# Patient Record
Sex: FEMALE | Race: BLACK OR AFRICAN AMERICAN | URBAN - METROPOLITAN AREA
[De-identification: names, ages, dates, MRNs, and addresses within clinical notes are randomized per-mention and may not be internally consistent; named-entity substitution may affect disease eponyms.]

---

## 2021-11-01 ENCOUNTER — TELEPHONE (OUTPATIENT)
Dept: FAMILY MEDICINE | Facility: CLINIC | Age: 36
End: 2021-11-01
Payer: COMMERCIAL

## 2021-12-01 ENCOUNTER — OFFICE VISIT (OUTPATIENT)
Dept: FAMILY MEDICINE | Facility: CLINIC | Age: 36
End: 2021-12-01
Payer: COMMERCIAL

## 2021-12-01 VITALS
HEART RATE: 90 BPM | DIASTOLIC BLOOD PRESSURE: 62 MMHG | WEIGHT: 119.38 LBS | BODY MASS INDEX: 21.15 KG/M2 | HEIGHT: 63 IN | RESPIRATION RATE: 16 BRPM | SYSTOLIC BLOOD PRESSURE: 104 MMHG

## 2021-12-01 DIAGNOSIS — F32.A DEPRESSION, UNSPECIFIED DEPRESSION TYPE: ICD-10-CM

## 2021-12-01 DIAGNOSIS — K58.9 IRRITABLE BOWEL SYNDROME, UNSPECIFIED TYPE: ICD-10-CM

## 2021-12-01 DIAGNOSIS — Z00.00 WELLNESS EXAMINATION: Primary | ICD-10-CM

## 2021-12-01 DIAGNOSIS — G47.00 INSOMNIA, UNSPECIFIED TYPE: ICD-10-CM

## 2021-12-01 DIAGNOSIS — Z13.31 POSITIVE DEPRESSION SCREENING: ICD-10-CM

## 2021-12-01 PROCEDURE — 99395 PR PREVENTIVE VISIT,EST,18-39: ICD-10-PCS | Mod: S$GLB,,, | Performed by: INTERNAL MEDICINE

## 2021-12-01 PROCEDURE — 99999 PR PBB SHADOW E&M-EST. PATIENT-LVL III: ICD-10-PCS | Mod: PBBFAC,,, | Performed by: INTERNAL MEDICINE

## 2021-12-01 PROCEDURE — 99395 PREV VISIT EST AGE 18-39: CPT | Mod: S$GLB,,, | Performed by: INTERNAL MEDICINE

## 2021-12-01 PROCEDURE — 99999 PR PBB SHADOW E&M-EST. PATIENT-LVL III: CPT | Mod: PBBFAC,,, | Performed by: INTERNAL MEDICINE

## 2021-12-01 RX ORDER — LORAZEPAM 0.5 MG/1
0.5 TABLET ORAL DAILY PRN
COMMUNITY
Start: 2021-11-21 | End: 2022-01-31

## 2021-12-01 RX ORDER — FLUOXETINE HYDROCHLORIDE 20 MG/1
60 CAPSULE ORAL EVERY MORNING
COMMUNITY
Start: 2021-11-23

## 2021-12-01 RX ORDER — MIRTAZAPINE 15 MG/1
15 TABLET, FILM COATED ORAL NIGHTLY
COMMUNITY
Start: 2021-11-05 | End: 2022-01-31

## 2021-12-05 PROBLEM — G47.00 INSOMNIA: Status: ACTIVE | Noted: 2021-12-05

## 2021-12-05 PROBLEM — K58.9 IRRITABLE BOWEL SYNDROME: Status: ACTIVE | Noted: 2021-12-05

## 2021-12-05 PROBLEM — F32.A DEPRESSION: Status: ACTIVE | Noted: 2021-12-05

## 2021-12-16 LAB
ALBUMIN SERPL-MCNC: 4.6 G/DL (ref 3.8–4.8)
ALBUMIN/GLOB SERPL: 2 {RATIO} (ref 1.2–2.2)
ALP SERPL-CCNC: 51 IU/L (ref 44–121)
ALT SERPL-CCNC: 48 IU/L (ref 0–32)
AST SERPL-CCNC: 37 IU/L (ref 0–40)
BASOPHILS # BLD AUTO: 0 X10E3/UL (ref 0–0.2)
BASOPHILS NFR BLD AUTO: 1 %
BILIRUB SERPL-MCNC: 0.3 MG/DL (ref 0–1.2)
BUN SERPL-MCNC: 10 MG/DL (ref 6–20)
BUN/CREAT SERPL: 15 (ref 9–23)
CALCIUM SERPL-MCNC: 9.3 MG/DL (ref 8.7–10.2)
CHLORIDE SERPL-SCNC: 101 MMOL/L (ref 96–106)
CHOLEST SERPL-MCNC: 191 MG/DL (ref 100–199)
CO2 SERPL-SCNC: 21 MMOL/L (ref 20–29)
CREAT SERPL-MCNC: 0.65 MG/DL (ref 0.57–1)
EOSINOPHIL # BLD AUTO: 0 X10E3/UL (ref 0–0.4)
EOSINOPHIL NFR BLD AUTO: 0 %
ERYTHROCYTE [DISTWIDTH] IN BLOOD BY AUTOMATED COUNT: 15.1 % (ref 11.7–15.4)
GLOBULIN SER CALC-MCNC: 2.3 G/DL (ref 1.5–4.5)
GLUCOSE SERPL-MCNC: 83 MG/DL (ref 65–99)
HCT VFR BLD AUTO: 34.9 % (ref 34–46.6)
HDLC SERPL-MCNC: 66 MG/DL
HGB BLD-MCNC: 10.6 G/DL (ref 11.1–15.9)
IMM GRANULOCYTES # BLD AUTO: 0 X10E3/UL (ref 0–0.1)
IMM GRANULOCYTES NFR BLD AUTO: 0 %
LDLC SERPL CALC-MCNC: 110 MG/DL (ref 0–99)
LYMPHOCYTES # BLD AUTO: 1.1 X10E3/UL (ref 0.7–3.1)
LYMPHOCYTES NFR BLD AUTO: 33 %
MCH RBC QN AUTO: 23.8 PG (ref 26.6–33)
MCHC RBC AUTO-ENTMCNC: 30.4 G/DL (ref 31.5–35.7)
MCV RBC AUTO: 78 FL (ref 79–97)
MONOCYTES # BLD AUTO: 0.3 X10E3/UL (ref 0.1–0.9)
MONOCYTES NFR BLD AUTO: 10 %
NEUTROPHILS # BLD AUTO: 1.9 X10E3/UL (ref 1.4–7)
NEUTROPHILS NFR BLD AUTO: 56 %
PLATELET # BLD AUTO: 416 X10E3/UL (ref 150–450)
POTASSIUM SERPL-SCNC: 4.7 MMOL/L (ref 3.5–5.2)
PROT SERPL-MCNC: 6.9 G/DL (ref 6–8.5)
RBC # BLD AUTO: 4.45 X10E6/UL (ref 3.77–5.28)
SARS-COV-2 IGG SERPL IA-ACNC: 27.8 AU/ML
SARS-COV-2 SPIKE AB INTERP: POSITIVE
SODIUM SERPL-SCNC: 136 MMOL/L (ref 134–144)
TRIGL SERPL-MCNC: 83 MG/DL (ref 0–149)
TSH SERPL DL<=0.005 MIU/L-ACNC: 0.7 UIU/ML (ref 0.45–4.5)
VLDLC SERPL CALC-MCNC: 15 MG/DL (ref 5–40)
WBC # BLD AUTO: 3.3 X10E3/UL (ref 3.4–10.8)

## 2021-12-17 ENCOUNTER — TELEPHONE (OUTPATIENT)
Dept: FAMILY MEDICINE | Facility: CLINIC | Age: 36
End: 2021-12-17
Payer: COMMERCIAL

## 2021-12-20 ENCOUNTER — OFFICE VISIT (OUTPATIENT)
Dept: FAMILY MEDICINE | Facility: CLINIC | Age: 36
End: 2021-12-20
Payer: COMMERCIAL

## 2021-12-20 VITALS
HEIGHT: 63 IN | BODY MASS INDEX: 20.66 KG/M2 | OXYGEN SATURATION: 98 % | SYSTOLIC BLOOD PRESSURE: 111 MMHG | HEART RATE: 87 BPM | WEIGHT: 116.63 LBS | TEMPERATURE: 99 F | DIASTOLIC BLOOD PRESSURE: 68 MMHG

## 2021-12-20 DIAGNOSIS — G50.0 TRIGEMINAL NEURALGIA: Primary | ICD-10-CM

## 2021-12-20 PROCEDURE — 99999 PR PBB SHADOW E&M-EST. PATIENT-LVL IV: CPT | Mod: PBBFAC,,, | Performed by: FAMILY MEDICINE

## 2021-12-20 PROCEDURE — 99214 PR OFFICE/OUTPT VISIT, EST, LEVL IV, 30-39 MIN: ICD-10-PCS | Mod: S$GLB,,, | Performed by: FAMILY MEDICINE

## 2021-12-20 PROCEDURE — 99999 PR PBB SHADOW E&M-EST. PATIENT-LVL IV: ICD-10-PCS | Mod: PBBFAC,,, | Performed by: FAMILY MEDICINE

## 2021-12-20 PROCEDURE — 99214 OFFICE O/P EST MOD 30 MIN: CPT | Mod: S$GLB,,, | Performed by: FAMILY MEDICINE

## 2021-12-20 RX ORDER — NAPROXEN 500 MG/1
500 TABLET ORAL 2 TIMES DAILY
COMMUNITY
Start: 2021-12-17 | End: 2021-12-20 | Stop reason: SDUPTHER

## 2021-12-20 RX ORDER — TRAMADOL HYDROCHLORIDE 50 MG/1
50 TABLET ORAL EVERY 6 HOURS PRN
COMMUNITY
Start: 2021-12-17 | End: 2021-12-20 | Stop reason: SDUPTHER

## 2021-12-20 RX ORDER — METHOCARBAMOL 500 MG/1
TABLET, FILM COATED ORAL
COMMUNITY
Start: 2021-12-17 | End: 2021-12-20

## 2021-12-20 RX ORDER — CARBAMAZEPINE 100 MG/1
100 CAPSULE, EXTENDED RELEASE ORAL 2 TIMES DAILY
Qty: 60 CAPSULE | Refills: 1 | Status: SHIPPED | OUTPATIENT
Start: 2021-12-20 | End: 2022-01-31

## 2021-12-20 RX ORDER — TRAMADOL HYDROCHLORIDE 50 MG/1
50 TABLET ORAL EVERY 6 HOURS PRN
Qty: 30 TABLET | Refills: 0 | Status: SHIPPED | OUTPATIENT
Start: 2021-12-20 | End: 2022-01-18

## 2021-12-20 RX ORDER — NAPROXEN 500 MG/1
500 TABLET ORAL 2 TIMES DAILY
Qty: 60 TABLET | Refills: 0 | Status: SHIPPED | OUTPATIENT
Start: 2021-12-20

## 2021-12-27 DIAGNOSIS — R74.8 ELEVATED LIVER ENZYMES: ICD-10-CM

## 2021-12-27 DIAGNOSIS — R79.89 ABNORMAL CBC: ICD-10-CM

## 2021-12-27 DIAGNOSIS — Z00.00 WELLNESS EXAMINATION: Primary | ICD-10-CM

## 2021-12-28 ENCOUNTER — TELEPHONE (OUTPATIENT)
Dept: FAMILY MEDICINE | Facility: CLINIC | Age: 36
End: 2021-12-28
Payer: COMMERCIAL

## 2022-01-06 LAB
HBV SURFACE AG SERPL QL IA: NEGATIVE
HCV AB S/CO SERPL IA: <0.1 S/CO RATIO (ref 0–0.9)
HIV 1+2 AB+HIV1 P24 AG SERPL QL IA: NON REACTIVE
IRON SATN MFR SERPL: 5 % (ref 15–55)
IRON SERPL-MCNC: 18 UG/DL (ref 27–159)
TIBC SERPL-MCNC: 346 UG/DL (ref 250–450)
UIBC SERPL-MCNC: 328 UG/DL (ref 131–425)

## 2022-01-10 DIAGNOSIS — R74.8 ELEVATED LIVER ENZYMES: ICD-10-CM

## 2022-01-10 DIAGNOSIS — D50.9 IRON DEFICIENCY ANEMIA, UNSPECIFIED IRON DEFICIENCY ANEMIA TYPE: Primary | ICD-10-CM

## 2022-01-14 DIAGNOSIS — G50.0 TRIGEMINAL NEURALGIA: ICD-10-CM

## 2022-01-14 NOTE — TELEPHONE ENCOUNTER
No new care gaps identified.  Powered by [employer]. Reference number: [id number].   1/14/2022 1:00:21 PM CST

## 2022-01-18 RX ORDER — TRAMADOL HYDROCHLORIDE 50 MG/1
TABLET ORAL
Qty: 20 TABLET | Refills: 0 | Status: SHIPPED | OUTPATIENT
Start: 2022-01-18

## 2022-01-18 NOTE — TELEPHONE ENCOUNTER
Sent in tramadol refill - she will need visit w me to eval her issues if continue for refill- make her f.u appt jourdan me

## 2022-01-20 ENCOUNTER — PATIENT OUTREACH (OUTPATIENT)
Dept: ADMINISTRATIVE | Facility: OTHER | Age: 37
End: 2022-01-20
Payer: COMMERCIAL

## 2022-01-20 NOTE — PROGRESS NOTES
Health Maintenance Due   Topic Date Due    COVID-19 Vaccine (1) Never done    Cervical Cancer Screening  Never done    TETANUS VACCINE  08/05/2012    Influenza Vaccine (1) Never done     Updates were requested from care everywhere.  Chart was reviewed for overdue Proactive [facility] Encounters ([facility]) topics (CRS, Breast Cancer Screening, Eye exam)  Health Maintenance has been updated.  LINKS immunization registry triggered.  Immunizations were reconciled.

## 2022-01-21 ENCOUNTER — TELEPHONE (OUTPATIENT)
Dept: NEUROLOGY | Facility: CLINIC | Age: 37
End: 2022-01-21
Payer: COMMERCIAL

## 2022-01-21 NOTE — TELEPHONE ENCOUNTER
----- Message from Lucho Amezcua sent at 1/21/2022 11:15 AM CST -----  Contact: Self  Type:  Patient Returning Call    Who Called:  Patient  Who Left Message for Patient:  Kerri Kirk  Does the patient know what this is regarding?:  r/s  Best Call Back Number:  [phone]  Additional Information:  PT states she missed a call from the clinic, and was returning the phone call. Please call pt back at [phone] to update and advise please.

## 2022-01-21 NOTE — TELEPHONE ENCOUNTER
Unable to leave message, mailbox full. Calling to reschedule appointment with Dr Nguyen on Monday.  Availability at this time Monday 1/31/2022 @ 8:00.

## 2022-01-21 NOTE — TELEPHONE ENCOUNTER
Called patient and rescheduled new patient appointment due to MD out ill. New Date/Time confirmed. Verbalized understanding.

## 2022-01-31 ENCOUNTER — OFFICE VISIT (OUTPATIENT)
Dept: NEUROLOGY | Facility: CLINIC | Age: 37
End: 2022-01-31
Payer: COMMERCIAL

## 2022-01-31 ENCOUNTER — LAB VISIT (OUTPATIENT)
Dept: LAB | Facility: HOSPITAL | Age: 37
End: 2022-01-31
Attending: PSYCHIATRY & NEUROLOGY
Payer: COMMERCIAL

## 2022-01-31 VITALS
RESPIRATION RATE: 17 BRPM | HEART RATE: 84 BPM | TEMPERATURE: 97 F | HEIGHT: 63 IN | SYSTOLIC BLOOD PRESSURE: 114 MMHG | WEIGHT: 113.75 LBS | BODY MASS INDEX: 20.16 KG/M2 | DIASTOLIC BLOOD PRESSURE: 77 MMHG

## 2022-01-31 DIAGNOSIS — K58.9 IRRITABLE BOWEL SYNDROME, UNSPECIFIED TYPE: ICD-10-CM

## 2022-01-31 DIAGNOSIS — G50.0 TRIGEMINAL NEURALGIA: ICD-10-CM

## 2022-01-31 DIAGNOSIS — F32.A DEPRESSION, UNSPECIFIED DEPRESSION TYPE: ICD-10-CM

## 2022-01-31 DIAGNOSIS — R51.9 LEFT FACIAL PAIN: Primary | ICD-10-CM

## 2022-01-31 DIAGNOSIS — D50.9 IRON DEFICIENCY ANEMIA, UNSPECIFIED IRON DEFICIENCY ANEMIA TYPE: ICD-10-CM

## 2022-01-31 DIAGNOSIS — F41.9 ANXIETY: ICD-10-CM

## 2022-01-31 DIAGNOSIS — G43.019 INTRACTABLE MIGRAINE WITHOUT AURA AND WITHOUT STATUS MIGRAINOSUS: ICD-10-CM

## 2022-01-31 DIAGNOSIS — G47.00 INSOMNIA, UNSPECIFIED TYPE: ICD-10-CM

## 2022-01-31 LAB
ANION GAP SERPL CALC-SCNC: 5 MMOL/L (ref 8–16)
BUN SERPL-MCNC: 5 MG/DL (ref 6–20)
CALCIUM SERPL-MCNC: 8.9 MG/DL (ref 8.7–10.5)
CHLORIDE SERPL-SCNC: 106 MMOL/L (ref 95–110)
CO2 SERPL-SCNC: 27 MMOL/L (ref 23–29)
CREAT SERPL-MCNC: 0.6 MG/DL (ref 0.5–1.4)
EST. GFR  (AFRICAN AMERICAN): >60 ML/MIN/1.73 M^2
EST. GFR  (NON AFRICAN AMERICAN): >60 ML/MIN/1.73 M^2
GLUCOSE SERPL-MCNC: 89 MG/DL (ref 70–110)
POTASSIUM SERPL-SCNC: 4.4 MMOL/L (ref 3.5–5.1)
SODIUM SERPL-SCNC: 138 MMOL/L (ref 136–145)

## 2022-01-31 PROCEDURE — 1160F PR REVIEW ALL MEDS BY PRESCRIBER/CLIN PHARMACIST DOCUMENTED: ICD-10-PCS | Mod: CPTII,S$GLB,, | Performed by: PSYCHIATRY & NEUROLOGY

## 2022-01-31 PROCEDURE — 1159F MED LIST DOCD IN RCRD: CPT | Mod: CPTII,S$GLB,, | Performed by: PSYCHIATRY & NEUROLOGY

## 2022-01-31 PROCEDURE — 99999 PR PBB SHADOW E&M-EST. PATIENT-LVL V: ICD-10-PCS | Mod: PBBFAC,,, | Performed by: PSYCHIATRY & NEUROLOGY

## 2022-01-31 PROCEDURE — 3078F PR MOST RECENT DIASTOLIC BLOOD PRESSURE < 80 MM HG: ICD-10-PCS | Mod: CPTII,S$GLB,, | Performed by: PSYCHIATRY & NEUROLOGY

## 2022-01-31 PROCEDURE — 1159F PR MEDICATION LIST DOCUMENTED IN MEDICAL RECORD: ICD-10-PCS | Mod: CPTII,S$GLB,, | Performed by: PSYCHIATRY & NEUROLOGY

## 2022-01-31 PROCEDURE — 36415 COLL VENOUS BLD VENIPUNCTURE: CPT | Mod: PO | Performed by: PSYCHIATRY & NEUROLOGY

## 2022-01-31 PROCEDURE — 3008F BODY MASS INDEX DOCD: CPT | Mod: CPTII,S$GLB,, | Performed by: PSYCHIATRY & NEUROLOGY

## 2022-01-31 PROCEDURE — 3074F SYST BP LT 130 MM HG: CPT | Mod: CPTII,S$GLB,, | Performed by: PSYCHIATRY & NEUROLOGY

## 2022-01-31 PROCEDURE — 3078F DIAST BP <80 MM HG: CPT | Mod: CPTII,S$GLB,, | Performed by: PSYCHIATRY & NEUROLOGY

## 2022-01-31 PROCEDURE — 3074F PR MOST RECENT SYSTOLIC BLOOD PRESSURE < 130 MM HG: ICD-10-PCS | Mod: CPTII,S$GLB,, | Performed by: PSYCHIATRY & NEUROLOGY

## 2022-01-31 PROCEDURE — 1160F RVW MEDS BY RX/DR IN RCRD: CPT | Mod: CPTII,S$GLB,, | Performed by: PSYCHIATRY & NEUROLOGY

## 2022-01-31 PROCEDURE — 3008F PR BODY MASS INDEX (BMI) DOCUMENTED: ICD-10-PCS | Mod: CPTII,S$GLB,, | Performed by: PSYCHIATRY & NEUROLOGY

## 2022-01-31 PROCEDURE — 99999 PR PBB SHADOW E&M-EST. PATIENT-LVL V: CPT | Mod: PBBFAC,,, | Performed by: PSYCHIATRY & NEUROLOGY

## 2022-01-31 PROCEDURE — 80048 BASIC METABOLIC PNL TOTAL CA: CPT | Performed by: PSYCHIATRY & NEUROLOGY

## 2022-01-31 PROCEDURE — 99205 OFFICE O/P NEW HI 60 MIN: CPT | Mod: S$GLB,,, | Performed by: PSYCHIATRY & NEUROLOGY

## 2022-01-31 PROCEDURE — 99205 PR OFFICE/OUTPT VISIT, NEW, LEVL V, 60-74 MIN: ICD-10-PCS | Mod: S$GLB,,, | Performed by: PSYCHIATRY & NEUROLOGY

## 2022-01-31 RX ORDER — BACLOFEN 10 MG/1
5-10 TABLET ORAL 3 TIMES DAILY PRN
Qty: 90 TABLET | Refills: 11 | Status: SHIPPED | OUTPATIENT
Start: 2022-01-31 | End: 2023-01-31

## 2022-01-31 RX ORDER — CARBAMAZEPINE 200 MG/1
TABLET, EXTENDED RELEASE ORAL
Qty: 120 TABLET | Refills: 6 | Status: SHIPPED | OUTPATIENT
Start: 2022-01-31 | End: 2022-06-03

## 2022-01-31 NOTE — PROGRESS NOTES
[facility] [place]    Chief complaint: facial pain  Referred by: Melvin Martínez MD  [address] [place] [address]     History of Present Illness:    36Y RH F with anxiety, depression, IBS, iron deficiency anemia, and headache who presents for initial evaluation of facial pain. She is here alone and able to provide history.     Headache/facial pain  history  Facial pain: began on 11/2021 at age of 36, last year she was tryign different antidepressants for depression/anxiety and in November 2021 she was prescribed lorazepam, she noted that it made her grind her teeth at night and also during the day. She had been on Prozac for 6 months prior, then was started on mirtazapine 15mg qhs which caused night sweats.  The lorazepam was started on November 2021 for anxiety, but she felt much more tense and when she went to sleep at night she was grinding her teeth. She took it for about 10 days or so. During that 10 day period she was have Left sided facial pain started in the TMJ mostly V2 distribution, then V3 into angle of mandible and she then had pain into the cheek. She mentions that she will have a continuous pain there is mild 3-4/10 in the whole Left face and into the temple. She will have superimposed electrical, shooting pain in the face which will happen all at the same time, she mentions that it typically starts in V2 distribution, then she will have it int he V1 and V3 distribution. She will have continuous pain for    She mentions that her temple is like one stab that stays for the whole time. She mentions that the face is stabbing intermittently for up to 45 minutes. She mentions that it starts as a stabbing or electrical shock. She might get nauseated, will loose appetite. She will have runny nose, tearing of the left eye as well during these attacks. She mentions that she has some photophobia will be on both eyes and phonophobia, nausea, no vomiting.   She also has  some left cervical paraspinal discomfort L>>R.  She mentions that carbamazepine when she started it 100 mg bid she felt it was worsening. She metnions that the facial pain will happen for many stabs for about 20-45 minutes, then stops for about 5 minutes and she can brush teeth, drink water, and do things , then will start again.      She mentions that initially she thought it could be shingles in the lumbar area in the left side only, so she thought she had shingles in this. She never had any actual vesicles.     Headache history: She had headache spells about 10 years ago in her 20s, but now they are very infrequent. She mentions that these headaches were moderate to severe, she would call them a migraine every now and then. She had light, sound sensitivities, not as much nausea with them, and kinesiophobia. She would take ibuprofen 800mg and would improve , but never resolve. They were lasting about 1 day. Last attack summer 2021.     Location: left V2 worse,   Character: sharp, shooting, electrical, lightning, lancinating, dull, burning, aching, pounding, pressure   Intensity:  3-10/10  Frequency: daily , 5-10 times a day up to over 10 times a day   Duration:   Associated symptoms: photophobia, phonophobia, kinesiophobia, neck tightness/pain  No neck trigger areas, no neck movements triggers.  She feels her mouth opening is limited due to triggering pain  She mentions that her jaw is not locking   Alleviating factors: sleep  Precipitating : ceiling fan, wind blowing in her face will trigger her facial pain when, brushing her teeth, talking, touching her face any area of her face will trigger it as well, talking, crying, touching her temple or left side of face.  No trigger zone in her face. She mentions that when she was grinding her teeth she thinks she broke her teeth. She has not seen by dentist. She mentions that in the back part of the mouth she feels sensivity in the back upper molars.   Family history of  headache: not really   ER/UC visits: none   Caffeine:  None   Sleep: she is taking prozac in the morning, her sleep is not doing well and has not slept a full night since this started.     Medication history:  Acute:  Apap - no benefit  Ibuprofen- no benefit  Naproxen - no benefit   Tramadol - some benefit     Preventive:  Gabapentin - no benefit   Tegretol 100 mg bid  - initially felt it was worsening, but now feels that it is helping, but feels not enough and is counting down the minutes until can take it again.     Neuroimaging and other studies:   No results found for this or any previous visit.  She states that she had CT of neck in ER at [place] and was told was normal, but I do not have the report or images for this.       ROS: The fourteen point review of system was performed.   Constitutional:  Fatigued since she has not been sleeping well due to pain, Denies fevers/cold or heat intolerance, weight loss/gain or fatigue.  Eyes:                         Denies diplopia, ptosis, visual field defects or ocular disease   excepting any listed above.  ENT:   Denies hearing loss, infection, carcinoma or other diseases excepting any listed above.   Cardiovascular:  Denies stroke, CAD, arrhythmia, CHF or other disease excepting any listed above.  Pulmonary:  Denies dyspnea, COPD, RAD or infection or neoplasm excepting any listed above.  Gastrointestinal: Denies ulcer disease, liver or other disease excepting any listed above.  Skin:   Denies rash, skin cancer, or other cutaneous disorder excepting any listed above.  Neurological:  See HPI  Musculoskeletal: Denies RA, fractures or other joint or skeletal problems excepting any listed above.  Heme/Lymphatic: Denies any blood or lymph system neoplasm or disorder excepting any listed above.  Endocrine:  Denies any thyroid or other disorders, excepting any listed above.  Allergic/Immuno: Denies any autoimmune disease or allergy excepting any listed  above.  Psychiatric:  +depression, anxiety, insomnia , Denies any disorder or treatment excepting any listed above.  Urologic:  Denies any difficulties with the urinary system or sexual function except noted above.    Past Medical History:   Diagnosis Date    Anxiety     Depression     Headache     IBS (irritable bowel syndrome)        Past Surgical History:   Procedure Laterality Date    COLONOSCOPY      2021    COLOSTOMY      Dr Archibald     ESOPHAGOGASTRODUODENOSCOPY       No family history on file.    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     2 children 20 years old and 7 years old  Social History     Tobacco Use    Smoking status: Never Smoker    Smokeless tobacco: Never Used   Substance Use Topics    Alcohol use: Not Currently     Review of patient's allergies indicates:  No Known Allergies      Current Outpatient Medications:     carBAMazepine (CARBATROL) 100 MG CM12, Take 1 capsule (100 mg total) by mouth 2 (two) times daily., Disp: 60 capsule, Rfl: 1    FLUoxetine 20 MG capsule, Take 60 mg by mouth every morning., Disp: , Rfl:     naproxen (NAPROSYN) 500 MG tablet, Take 1 tablet (500 mg total) by mouth 2 (two) times daily., Disp: 60 tablet, Rfl: 0    traMADoL (ULTRAM) 50 mg tablet, TAKE 1 TABLET BY MOUTH EVERY 6 HOURS AS NEEDED FOR PAIN, Disp: 20 tablet, Rfl: 0    LORazepam (ATIVAN) 0.5 MG tablet, Take 0.5 mg by mouth daily as needed., Disp: , Rfl:     mirtazapine (REMERON) 15 MG tablet, Take 15 mg by mouth nightly., Disp: , Rfl:     multivitamin capsule, Take 1 capsule by mouth once daily., Disp: , Rfl:     PHYSICAL EXAMINATION  Vitals:    01/31/22 0801   BP: 114/77   Pulse: 84   Resp: 17   Temp: 97.4 °F (36.3 °C)   General: The patient is a well-developed, well-nourished looking of stated age in mild acute distress becoming tearful during visit  Head: Normocephalic, atraumatic  Eyes, ears, nose and throat: normal.  Neck: Supple  Cardiovascular: No carotid  bruits.  Regular rate and rhythm.   Extremities: No clubbing, cyanosis, edema.  Able to fully open mouth, good movements  No ttp over Left TMJ, no trigger over palpation of left masseters/temporalis  No clicking/popping  There is cutaneous allodynia in V1-v3 and temporalis area   No rash/ no vesicles noted   NEUROLOGIC EXAM:  MENTAL: The patient is awake, alert and oriented times to time, place, location and situation. Intact recent memory, attention, concentration. Language and speech are normal. No aphasia, no dysarthria  CRANIAL NERVES:   CN II: visual fields are intact to confrontation with no defects;  funduscopic examination is within normal limits without evidence of papilledema and signs of venous pulsations.  Pupils are equal, round and reactive to light and accommodation.  No RAPD, no Yamilet's  III, IV and VI: extraocular movements are intact to all directions of gaze with normal convergence.  V: facial sensation is intact to light touch in divisions V1 through V3.  +cutaneous allodynia in V1-V3 on left   VII: Facial muscle strength is intact to eyebrow raising, forceful eyelid closure, and cheek puffing.    VIII: Hearing acuity is intact to finger rub.  IX, X: palate rises symmetrically and uvula midline.    XI: Sternocleidomastoid and trapezius strength are 5/5 bilaterally.    XII: Tongue protrudes midline without atrophy or fasciculations.   MOTOR EXAM: No atrophy or fasciculations are present. No pronator drift,  shows normal strength ( 5/5 strength )in all 4 extremities. Tone is normal.   SENSORY EXAM: intact pinprick, light touch, vibration, and position bilaterally.  CEREBELLAR EXAM: shows normal finger-to-nose and heel-to-shin.   DEEP TENDON REFLEXES:  +1 in brachioradialis, biceps, triceps, 1+ knee jerks with Jendrassik maneuver,  ankle jerks, downgoing toes b/l. No abnormal reflexes are present.  GAIT/STANCE: Romberg Negative.  Standard gait was normal with normal stride, arm swing and turning.   Normal  tandem gait.       Impression:  Left sided facial pain/trigeminal neuralgia - new onset left sided facial pain began V2 distribution and spread through Left V1, V3 and temporalis area which began while taking lorazapem for about 10 days which caused a lot of grinding of teeth and increased anxiety. She feels that she likely fractured teeth, but does not have dental insurance at the moment and has not been evaluated. She has continued to have daily continuous pain which is mild continuous V1-V3/temporalis pain, continuous cutaneous allodynia, daily photophobia, phonophobia, nausea.  She has superimposed lightning, electrical, shooting, burning pain mainly starting in V2 and spreading immediately from there to v1/v3 and even stabbing pain into the left temple which is many attacks back to back for 20 -45 minutes or so, then will have a break of about 5 minutes, then can be triggered on its own or from brushing, talking, cold air hitting her face, touching her face, etc. She has when pain is severe and the attacks of sharp pain tearing of the left eye at times and some ipsilateral runny nose, but the pain is the worse. Neurological examination did not demonstrate any sensory loss to trigeminal distribution, she has cutaneous allodynia, TMJ exam did not reveal any limited rom or pain on movement or palpation of the joint. I recommended she see dentist to evaluate for her concern of tooth fracture as that is outside scope of what we can do in clinic. She has found some benefit to carbamazepine 100mg bid, will change to Tegretol XR 200mg bid and can increase up to 400mg bid slowly depending on outcomes. We will check her sodium to ensure not dropping. Discussed MOA and side effect profile of tegetrol. Will have her do baclofen 10mg nightly helps with the muscle spasms she is having and with facial pain as well. She needs neuroimaging to look for secondary causes of facial pain as there are some features not  typical of [place].   She has underlying migraine history and some of her symptoms are really not related to facial pain and more migrainous in etiology thus will have her trial magnesium. Important to avoid pregnancy while on these medications.     Migraine without aura - will get neuroimaging and start magnesium         Comorbidities:  Anxiety/depression - followed by Dr. Jaden Louise  Insomnia- with anxiety/depression, but now more driven by pain per her report.   Iron deficiency anemia- followed by PCP and on supplementation  IBS- corrected per her report     Plan:   1- For preventive management: Increase Carbamazepine to 200mg twice a day x 1 week, if still having breakthough pain can increase to 400mg in am and 200mg nightly x 5-7 days, then 400mg twice a day.    2- For evening Baclofen 10mg nightly , can be done for daytime breakthrough pain 5mg to 10mg up to 2 times more -> this can make you sleepy so be careful driving, operating heavy machinery, swimming and anything that needs you to be mentally sharp    3- Start magnesium glycinate 400mg daily     4- MRI of brain with and without contrast and MRA head.     RTC in 4 weeks.         Debo Lazar MD   Board Certified Neurologist  [facility] Certified Headache Medicine     I spent  77   minutes on day of this encounter preparing, treating, and managing this patient with left facial pain, trigeminal neuralgia, migraine without aura, anxiety, depression, insomnia, ibs, iron deficiency anemia

## 2022-01-31 NOTE — PATIENT INSTRUCTIONS
1- For preventive management: Change and Increase Carbamazepine to 200mg twice a day x 1 week, if still having breakthough pain can increase to 400mg in am and 200mg nightly x 5-7 days, then 400mg twice a day.     2- For evening Baclofen 10mg nightly , can be done for daytime breakthrough pain 5mg to 10mg up to 2 times more -> this can make you sleepy so be careful driving, operating heavy machinery, swimming and anything that needs you to be mentally sharp    3- Start magnesium glycinate 400mg to 500mg daily --> this is over the counter     4- MRI of brain with and without contrast and MRA head.

## 2022-02-15 ENCOUNTER — PATIENT MESSAGE (OUTPATIENT)
Dept: NEUROLOGY | Facility: CLINIC | Age: 37
End: 2022-02-15
Payer: COMMERCIAL

## 2022-02-15 DIAGNOSIS — R51.9 FACIAL PAIN: Primary | ICD-10-CM

## 2022-02-15 DIAGNOSIS — J33.8 MAXILLARY SINUS POLYP: ICD-10-CM

## 2022-02-24 ENCOUNTER — PATIENT OUTREACH (OUTPATIENT)
Dept: ADMINISTRATIVE | Facility: OTHER | Age: 37
End: 2022-02-24
Payer: COMMERCIAL

## 2022-02-25 ENCOUNTER — OFFICE VISIT (OUTPATIENT)
Dept: OTOLARYNGOLOGY | Facility: CLINIC | Age: 37
End: 2022-02-25
Payer: COMMERCIAL

## 2022-02-25 DIAGNOSIS — J34.1 MUCOCELE OF MAXILLARY SINUS: ICD-10-CM

## 2022-02-25 DIAGNOSIS — J30.2 SEASONAL ALLERGIC RHINITIS, UNSPECIFIED TRIGGER: Primary | ICD-10-CM

## 2022-02-25 DIAGNOSIS — R51.9 FACIAL PAIN: ICD-10-CM

## 2022-02-25 DIAGNOSIS — G50.0 TRIGEMINAL NEURALGIA: ICD-10-CM

## 2022-02-25 PROCEDURE — 99203 PR OFFICE/OUTPT VISIT, NEW, LEVL III, 30-44 MIN: ICD-10-PCS | Mod: 95,,, | Performed by: OTOLARYNGOLOGY

## 2022-02-25 PROCEDURE — 1160F RVW MEDS BY RX/DR IN RCRD: CPT | Mod: CPTII,95,, | Performed by: OTOLARYNGOLOGY

## 2022-02-25 PROCEDURE — 1159F MED LIST DOCD IN RCRD: CPT | Mod: CPTII,95,, | Performed by: OTOLARYNGOLOGY

## 2022-02-25 PROCEDURE — 99203 OFFICE O/P NEW LOW 30 MIN: CPT | Mod: 95,,, | Performed by: OTOLARYNGOLOGY

## 2022-02-25 PROCEDURE — 1159F PR MEDICATION LIST DOCUMENTED IN MEDICAL RECORD: ICD-10-PCS | Mod: CPTII,95,, | Performed by: OTOLARYNGOLOGY

## 2022-02-25 PROCEDURE — 1160F PR REVIEW ALL MEDS BY PRESCRIBER/CLIN PHARMACIST DOCUMENTED: ICD-10-PCS | Mod: CPTII,95,, | Performed by: OTOLARYNGOLOGY

## 2022-02-25 RX ORDER — FLUTICASONE PROPIONATE 50 MCG
1 SPRAY, SUSPENSION (ML) NASAL 2 TIMES DAILY
Qty: 16 G | Refills: 11 | Status: SHIPPED | OUTPATIENT
Start: 2022-02-25 | End: 2024-02-17

## 2022-02-25 NOTE — PROGRESS NOTES
Virtual visit   Subjective:     Chief Complaint:  Facial pain    Kalia Carballo is a 36 y.o. female who was referred to me by Dr. Debo Nguyen * in consultation for abnormal MRI/facial pain.    Pain in the on the left side of the face in Nov 2021. Pain started in her upper teeth. She saw her dentist who did not find any abnormalities. Pain migrated to her sinus region. Now pain is present over the temporal and jaw region. Pain is stabbing and sometimes shooting. She has had tearing of the left eye during this time. She denies vision changes.     She does have shingles at the age of 32. She does reports mild allergy symptoms in the spring or the fall. Symptoms are mild. She does not take allergy medications regularly.     There is not a prior history of sinonasal surgery.  She is not an active smoker.    Past Medical History  She has a past medical history of Anxiety, Depression, Headache, and IBS (irritable bowel syndrome).    Past Surgical History  She has a past surgical history that includes Colonoscopy; Esophagogastroduodenoscopy; and Colostomy.    Family History  Her family history is not on file.    Social History  She reports that she has never smoked. She has never used smokeless tobacco. She reports previous alcohol use.    Allergies  She has No Known Allergies.    Medications  She has a current medication list which includes the following prescription(s): baclofen, carbamazepine, fluoxetine, fluticasone propionate, multivitamin, naproxen, and tramadol.    Review of Systems     Constitutional: Positive for appetite change, fatigue and unexpected weight loss.      HENT: Positive for ear discharge, facial swelling, postnasal drip, sinus pressure and dental problems.      Eyes:  Positive for eye drainage and photophobia.     Respiratory:  Negative for cough, shortness of breath, sleep apnea, snoring and wheezing.      Cardiovascular:  Positive for irregular heartbeat.     Gastrointestinal:  Negative for  abdominal pain, acid reflux, constipation, diarrhea, heartburn and vomiting.     Genitourinary: Negative for difficulty urinating, sexual problems and frequent urination.     Musc: Positive for aching muscles and neck pain.     Skin: Negative for rash.     Allergy: Positive for seasonal allergies.     Endocrine: Positive for cold intolerance and heat intolerance.     Neurological: Positive for dizziness and light-headedness.     Hematologic: Negative for bruises/bleeds easily and swollen glands.      Psychiatric: Positive for decreased concentration, depression, nervous/anxious and sleep disturbance.              Objective:   Virtual visit   There were no vitals taken for this visit.     Constitutional:   Vital signs are normal. She appears well-developed and well-nourished. Normal speech.      Head:  Normocephalic and atraumatic.     Psychiatric:   She has a normal mood and affect. Her speech is normal and behavior is normal.       Procedure    None        Data Reviewed    MRI:   Mucus retention cyst left maxillary sinus occupying approx 80% of sinus   Tiny retention cyst right maxillary sinus      Assessment:     1. Seasonal allergic rhinitis, unspecified trigger    2. Facial pain    3. Mucocele of maxillary sinus    4. Trigeminal neuralgia          Plan:     I had a long discussion with the patient regarding her condition and the further workup and management options.  Her facial pain is more consistent with trigeminal neuralgia.  Her MRI does however revealed a large mucous retention cyst in the left maxillary sinus. Typically these are asymptomatic; her cyst does not appear to be blocking the sinus outflow tract.  She does have mild seasonal allergy symptoms.  We discussed treatment of sinonasal inflammation with intranasal steroid spray twice a day and at least once a day use of the sinus irrigation.  Discussed use with distilled water.  Plan on evaluating the sinuses in 6 weeks with a CT scan.  She will  notify me sooner if her symptoms progress.  All questions answered patient was encouraged call with any questions or concerns.

## 2022-02-28 ENCOUNTER — TELEPHONE (OUTPATIENT)
Dept: OTOLARYNGOLOGY | Facility: CLINIC | Age: 37
End: 2022-02-28
Payer: COMMERCIAL

## 2022-03-07 ENCOUNTER — OFFICE VISIT (OUTPATIENT)
Dept: NEUROLOGY | Facility: CLINIC | Age: 37
End: 2022-03-07
Payer: COMMERCIAL

## 2022-03-07 VITALS
WEIGHT: 113 LBS | HEIGHT: 63 IN | TEMPERATURE: 98 F | BODY MASS INDEX: 20.02 KG/M2 | DIASTOLIC BLOOD PRESSURE: 70 MMHG | SYSTOLIC BLOOD PRESSURE: 103 MMHG | HEART RATE: 75 BPM | RESPIRATION RATE: 17 BRPM

## 2022-03-07 DIAGNOSIS — G50.0 TRIGEMINAL NEURALGIA: Primary | ICD-10-CM

## 2022-03-07 DIAGNOSIS — G43.019 INTRACTABLE MIGRAINE WITHOUT AURA AND WITHOUT STATUS MIGRAINOSUS: ICD-10-CM

## 2022-03-07 DIAGNOSIS — R51.9 LEFT FACIAL PAIN: ICD-10-CM

## 2022-03-07 PROCEDURE — 1159F PR MEDICATION LIST DOCUMENTED IN MEDICAL RECORD: ICD-10-PCS | Mod: CPTII,S$GLB,, | Performed by: PSYCHIATRY & NEUROLOGY

## 2022-03-07 PROCEDURE — 3078F DIAST BP <80 MM HG: CPT | Mod: CPTII,S$GLB,, | Performed by: PSYCHIATRY & NEUROLOGY

## 2022-03-07 PROCEDURE — 99214 OFFICE O/P EST MOD 30 MIN: CPT | Mod: S$GLB,,, | Performed by: PSYCHIATRY & NEUROLOGY

## 2022-03-07 PROCEDURE — 1159F MED LIST DOCD IN RCRD: CPT | Mod: CPTII,S$GLB,, | Performed by: PSYCHIATRY & NEUROLOGY

## 2022-03-07 PROCEDURE — 1160F PR REVIEW ALL MEDS BY PRESCRIBER/CLIN PHARMACIST DOCUMENTED: ICD-10-PCS | Mod: CPTII,S$GLB,, | Performed by: PSYCHIATRY & NEUROLOGY

## 2022-03-07 PROCEDURE — 3008F BODY MASS INDEX DOCD: CPT | Mod: CPTII,S$GLB,, | Performed by: PSYCHIATRY & NEUROLOGY

## 2022-03-07 PROCEDURE — 3008F PR BODY MASS INDEX (BMI) DOCUMENTED: ICD-10-PCS | Mod: CPTII,S$GLB,, | Performed by: PSYCHIATRY & NEUROLOGY

## 2022-03-07 PROCEDURE — 99999 PR PBB SHADOW E&M-EST. PATIENT-LVL IV: CPT | Mod: PBBFAC,,, | Performed by: PSYCHIATRY & NEUROLOGY

## 2022-03-07 PROCEDURE — 3074F SYST BP LT 130 MM HG: CPT | Mod: CPTII,S$GLB,, | Performed by: PSYCHIATRY & NEUROLOGY

## 2022-03-07 PROCEDURE — 99214 PR OFFICE/OUTPT VISIT, EST, LEVL IV, 30-39 MIN: ICD-10-PCS | Mod: S$GLB,,, | Performed by: PSYCHIATRY & NEUROLOGY

## 2022-03-07 PROCEDURE — 99999 PR PBB SHADOW E&M-EST. PATIENT-LVL IV: ICD-10-PCS | Mod: PBBFAC,,, | Performed by: PSYCHIATRY & NEUROLOGY

## 2022-03-07 PROCEDURE — 3074F PR MOST RECENT SYSTOLIC BLOOD PRESSURE < 130 MM HG: ICD-10-PCS | Mod: CPTII,S$GLB,, | Performed by: PSYCHIATRY & NEUROLOGY

## 2022-03-07 PROCEDURE — 3078F PR MOST RECENT DIASTOLIC BLOOD PRESSURE < 80 MM HG: ICD-10-PCS | Mod: CPTII,S$GLB,, | Performed by: PSYCHIATRY & NEUROLOGY

## 2022-03-07 PROCEDURE — 1160F RVW MEDS BY RX/DR IN RCRD: CPT | Mod: CPTII,S$GLB,, | Performed by: PSYCHIATRY & NEUROLOGY

## 2022-03-07 RX ORDER — OXCARBAZEPINE 150 MG/1
TABLET, FILM COATED ORAL
Qty: 120 TABLET | Refills: 3 | Status: SHIPPED | OUTPATIENT
Start: 2022-03-07 | End: 2022-06-03

## 2022-03-07 RX ORDER — GABAPENTIN 300 MG/1
300-600 CAPSULE ORAL 3 TIMES DAILY
Qty: 180 CAPSULE | Refills: 11 | Status: SHIPPED | OUTPATIENT
Start: 2022-03-07 | End: 2023-03-07

## 2022-03-07 NOTE — PATIENT INSTRUCTIONS
1- For preventive management: A. Transition from Tegretol to Trileptal (oxcarbazepine) 150 mg twice a day x 2-3 days, then 300 mg twice a day. If you are tolerating then send me message, we might increase dose depending on how you are doing.              B. Start gabapentin 300 mg nightly  x 1 week, then increase to 600mg nightly     2- Start magnesium oxide or glycinate 400 mg daily     3- continue ENT evaluation     4 - send me message with dentist update.

## 2022-03-07 NOTE — PROGRESS NOTES
[facility]    Chief complaint: facial pain    History of Present Illness:    36Y RH F with anxiety, depression, IBS, iron deficiency anemia, and headache who presents for further evaluation of Left TN. She was initially seen on 1/31/22 at which time she reported left sided facial pain starting in November 2021. She reported having grinding of teeth while on Ativan. She reported continuous V1-V3 temporalis pain, cutaneous allodynia, stabbing, photophobia, phonophobia, nausea with superimposed lightning/eletrical pain in V2 then v1-v3 multiple pain for 20-45 minutes. These electrical pain triggered by  brushing, talking, cold air hitting her face, touching her face, etc. She has when pain is severe and the attacks of sharp pain tearing of the left eye at times and some ipsilateral runny nose, but the pain is the worse.  She was increased from tegretol 100mg bid to 200mg bid and started on magnesium for migraine headache.      Today she reports she is a little better, taking medicine relieves pain little, left side of face. Currently 3/10, at best 1-2/10, and up to 10/10. She had MRI brain which did not reveal any intracranial abnormalities. She was found to have a large mucuous retention cyst in left maxillary sinus, which typically are asymptomatic, but she was sent to ENT for further guidance. It was felt that some cyst was not blocking sinus outflow tract, shewas recommended intranasal steroid bid nd once a day using sinus irrigation with distilled water , plan was to repeat CT scan sinus in 6 weeks. Since increasing Tegretol  mg twice a day her facial pain is improving, less severe, less escalations, but still occurring especially with eating , chewing , talking, touching the face will trigger it. She did increase to 400 mg twice a day for a few days, but felt she was foggy and out of it had difficulty concentrating so could not continue it. She feels that tegretol is  helping the facial pain. She continues to have temporal headache pressure with light/sound sensitivity/nausea which is almost daily. She continues to be concerned about possible fracture to one of her molars. She will be seeing her dentist this week.  She is following ENT recommendations. She does feel tegretol helps her be functional, but still having significant facial pain episodes.     Headache/facial pain  history  Facial pain: began on 11/2021 at age of 36, last year she was trying different antidepressants for depression/anxiety and in November 2021 she was prescribed lorazepam, she noted that it made her grind her teeth at night and also during the day. She had been on Prozac for 6 months prior, then was started on mirtazapine 15mg qhs which caused night sweats.  The lorazepam was started on November 2021 for anxiety, but she felt much more tense and when she went to sleep at night she was grinding her teeth. She took it for about 10 days or so. During that 10 day period she was have Left sided facial pain started in the TMJ mostly V2 distribution, then V3 into angle of mandible and she then had pain into the cheek. She mentions that she will have a continuous pain there is mild 3-4/10 in the whole Left face and into the temple. She will have superimposed electrical, shooting pain in the face which will happen all at the same time, she mentions that it typically starts in V2 distribution, then she will have it int he V1 and V3 distribution. She will have continuous pain for    She mentions that her temple is like one stab that stays for the whole time. She mentions that the face is stabbing intermittently for up to 45 minutes. She mentions that it starts as a stabbing or electrical shock. She might get nauseated, will loose appetite. She will have runny nose, tearing of the left eye as well during these attacks. She mentions that she has some photophobia will be on both eyes and phonophobia, nausea, no  vomiting.   She also has some left cervical paraspinal discomfort L>>R.  She mentions that carbamazepine when she started it 100 mg bid she felt it was worsening. She metnions that the facial pain will happen for many stabs for about 20-45 minutes, then stops for about 5 minutes and she can brush teeth, drink water, and do things , then will start again.      She mentions that initially she thought it could be shingles in the lumbar area in the left side only, so she thought she had shingles in this. She never had any actual vesicles.     Headache history: She had headache spells about 10 years ago in her 20s, but now they are very infrequent. She mentions that these headaches were moderate to severe, she would call them a migraine every now and then. She had light, sound sensitivities, not as much nausea with them, and kinesiophobia. She would take ibuprofen 800mg and would improve , but never resolve. They were lasting about 1 day. Last attack summer 2021.     Location: left V2 worse,   Character: sharp, shooting, electrical, lightning, lancinating, dull, burning, aching, pounding, pressure   Intensity:  3-10/10  Frequency: daily , 5-10 times a day up to over 10 times a day   Duration:   Associated symptoms: photophobia, phonophobia, kinesiophobia, neck tightness/pain  No neck trigger areas, no neck movements triggers.  She feels her mouth opening is limited due to triggering pain  She mentions that her jaw is not locking   Alleviating factors: sleep  Precipitating : ceiling fan, wind blowing in her face will trigger her facial pain when, brushing her teeth, talking, touching her face any area of her face will trigger it as well, talking, crying, touching her temple or left side of face.  No trigger zone in her face. She mentions that when she was grinding her teeth she thinks she broke her teeth. She has not seen by dentist. She mentions that in the back part of the mouth she feels sensivity in the back upper  molars.   Family history of headache: not really   ER/UC visits: none   Caffeine:  None   Sleep: she is taking prozac in the morning, her sleep is not doing well and has not slept a full night since this started.     Medication history:  Acute:  Apap - no benefit  Ibuprofen- no benefit  Naproxen - no benefit   Tramadol - some benefit     Preventive:  Gabapentin - no benefit   Tegretol 100 mg bid - initially felt it was worsening, but now feels that it is helping, but feels not enough and is counting down the minutes until can take it again.   Tegretol 200mg bid - improved the intensity   Baclofen 10mg- used a few times -> unclear if helpful.     Neuroimaging and other studies:   MRI brain w/wo contrast 2/22:  FINDINGS:  TRIGEMINAL NERVES: Trigeminal nerves are symmetric and normal in size and signal.  No abnormal enhancement.  No mass effect on or displacement of the trigeminal nerves.  Meckel's caves are normal in size and appearance bilaterally.     INTRACRANIAL: Brain parenchyma demonstrates normal signal and configuration.  No abnormal enhancement demonstrated.  No parenchymal restricted diffusion.  No evidence of intracranial hemorrhage.  No extra-axial fluid collection or mass.  No intracranial mass effect.  No hydrocephalus.  Midline structures have a normal configuration.  Visualized pituitary gland and infundibulum are normal.  Visualized major intracranial vascular structures demonstrate normal flow voids and are normal in course and caliber.  AICAs are noted to extend into the internal auditory canals bilaterally.  No abnormal signal in the cranial nerve 7/8 complexes.     SINUSES: Polypoid opacity in the left maxillary sinus.  Small frothy opacity in the right sphenoid sinus.  Tiny mucous retention cyst versus polyp in the right maxillary sinus.  Trace bilateral ventral ethmoid and right frontal sinus mucosal thickening.  Small left mastoid effusion.     ORBITS: Visualized orbits are  normal.     Impression:     No acute intracranial abnormality.  No trigeminal nerve abnormality identified.     MRA head 2/22:  FINDINGS:  INTERNAL CAROTIDS: No occlusion, hemodynamically significant stenosis or aneurysm.     ANTERIOR CEREBRALS: No proximal branch occlusion, hemodynamically significant stenosis or aneurysm.     MIDDLE CEREBRALS: No proximal branch occlusion, hemodynamically significant stenosis or aneurysm.     POSTERIOR CEREBRALS: No proximal branch occlusion, hemodynamically significant stenosis or aneurysm.     BASILAR: No occlusion, hemodynamically significant stenosis or aneurysm.     VERTEBRALS: No occlusion, hemodynamically significant stenosis or aneurysm.     OTHER: No vascular malformation.     Impression:     No occlusion, hemodynamically significant stenosis or aneurysm.    ROS: The fourteen point review of system was performed.   Constitutional:  Fatigued since she has not been sleeping well due to pain, Denies fevers/cold or heat intolerance, weight loss/gain or fatigue.  Eyes:                         Denies diplopia, ptosis, visual field defects or ocular disease   excepting any listed above.  ENT:   Denies hearing loss, infection, carcinoma or other diseases excepting any listed above.   Cardiovascular:  Denies stroke, CAD, arrhythmia, CHF or other disease excepting any listed above.  Pulmonary:  Denies dyspnea, COPD, RAD or infection or neoplasm excepting any listed above.  Gastrointestinal: Denies ulcer disease, liver or other disease excepting any listed above.  Skin:   Denies rash, skin cancer, or other cutaneous disorder excepting any listed above.  Neurological:  See HPI  Musculoskeletal: Denies RA, fractures or other joint or skeletal problems excepting any listed above.  Heme/Lymphatic: Denies any blood or lymph system neoplasm or disorder excepting any listed above.  Endocrine:  Denies any thyroid or other disorders, excepting any listed above.  Allergic/Immuno: Denies any  autoimmune disease or allergy excepting any listed above.  Psychiatric:  +depression, anxiety, insomnia , Denies any disorder or treatment excepting any listed above.  Urologic:  Denies any difficulties with the urinary system or sexual function except noted above.    Past Medical History:   Diagnosis Date    Anxiety     Depression     Headache     IBS (irritable bowel syndrome)        Past Surgical History:   Procedure Laterality Date    COLONOSCOPY      2021    COLOSTOMY      Dr Archibald     ESOPHAGOGASTRODUODENOSCOPY       No family history on file.    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     2 children 20 years old and 7 years old  Social History     Tobacco Use    Smoking status: Never Smoker    Smokeless tobacco: Never Used   Substance Use Topics    Alcohol use: Not Currently     Review of patient's allergies indicates:  No Known Allergies      Current Outpatient Medications:     carBAMazepine (TEGRETOL XR) 200 MG 12 hr tablet, 1 tab po bid x 1 week, then 2 tabs in am and 1 tab qhs x 1 week, then 2 tab po bid, Disp: 120 tablet, Rfl: 6    FLUoxetine 20 MG capsule, Take 60 mg by mouth every morning., Disp: , Rfl:     fluticasone propionate (FLONASE) 50 mcg/actuation nasal spray, 1 spray (50 mcg total) by Each Nostril route 2 (two) times a day., Disp: 16 g, Rfl: 11    multivitamin capsule, Take 1 capsule by mouth once daily., Disp: , Rfl:     naproxen (NAPROSYN) 500 MG tablet, Take 1 tablet (500 mg total) by mouth 2 (two) times daily., Disp: 60 tablet, Rfl: 0    traMADoL (ULTRAM) 50 mg tablet, TAKE 1 TABLET BY MOUTH EVERY 6 HOURS AS NEEDED FOR PAIN, Disp: 20 tablet, Rfl: 0    baclofen (LIORESAL) 10 MG tablet, Take 0.5-1 tablets (5-10 mg total) by mouth 3 (three) times daily as needed (facial pain). (Patient not taking: Reported on 3/7/2022), Disp: 90 tablet, Rfl: 11    PHYSICAL EXAMINATION  Vitals:    03/07/22 1123   Resp: 17   Temp: 97.6 °F (36.4 °C)   General:  The patient is a well-developed, well-nourished looking of stated age in mild acute distress becoming tearful during visit  Able to fully open mouth, good movements  No ttp over Left TMJ, no trigger over palpation of left masseters/temporalis  No clicking/popping  There is cutaneous allodynia in V1-v3 and temporalis area   NEUROLOGIC EXAM:  MENTAL: The patient is awake, alert and oriented times to time, place, location and situation. Intact recent memory, attention, concentration. Language and speech are normal. No aphasia, no dysarthria  CRANIAL NERVES:   EOMI, no facial asymmetry    MOTOR EXAM: moving UEs/LEs symmetrically   Coordination; no observable dysmetria in UEs/LEs  GAIT/STANCE: Standard gait was normal with normal stride, arm swing and turning.  No gait ataxia.       Impression:  Idiopathic trigeminal neuralgia with concomitant continuous pain  - new onset left sided facial pain began V2 distribution and spread through Left V1, V3 and temporalis area which began while taking lorazapem for about 10 days which caused a lot of grinding of teeth and increased anxiety. She feels that she likely fractured teeth, but does not have dental insurance at the moment and has not been evaluated. She has continued to have daily continuous pain which is mild continuous V1-V3/temporalis pain, continuous cutaneous allodynia, daily photophobia, phonophobia, nausea.  She has superimposed lightning, electrical, shooting, burning pain mainly starting in V2 and spreading immediately from there to v1/v3 and even stabbing pain into the left temple which is many attacks back to back for 20 -45 minutes or so, then will have a break of about 5 minutes, then can be triggered on its own or from brushing, talking, cold air hitting her face, touching her face, etc. She has when pain is severe and the attacks of sharp pain tearing of the left eye at times and some ipsilateral runny nose, but the pain is the worse. Neurological  examination did not demonstrate any sensory loss to trigeminal distribution, she has cutaneous allodynia, TMJ exam did not reveal any limited rom or pain on movement or palpation of the joint. I recommended she see dentist to evaluate for her concern of tooth fracture as that is outside scope of what we can do in clinic and will see them this week. She has seen improvement with Tegretol 200mg bid, but not enough coverage and still having frequent triggering of facial pain. Unable to tolerate higher dose with cognitive side effects. Will transition her to trileptal see if that way she can tolerate this better to get to a more therapeutic dose.         She has found some benefit to carbamazepine 100mg bid, will change to Tegretol XR 200mg bid and can increase up to 400mg bid slowly depending on outcomes. We will check her sodium to ensure not dropping. Discussed MOA and side effect profile of tegetrol. Will have her do baclofen 10mg nightly helps with the muscle spasms she is having and with facial pain as well. She needs neuroimaging to look for secondary causes of facial pain as there are some features not typical of TN.   She has underlying migraine history and some of her symptoms are really not related to facial pain and more migrainous in etiology thus will have her trial magnesium. Important to avoid pregnancy while on these medications.     Migraine without aura - no secondary headache on neuroimaging. The daily pressure with light/sound sensitivity/nausea is more c/w migraine without aura history. Discussed starting magnesium and we will sart some gabapentin as well. She is concerned about taking too many medications at once. Will discuss migraine specific acute management our next visit.         Comorbidities:  Anxiety/depression - followed by Dr. Jaden Louise  Insomnia- with anxiety/depression, but now more driven by pain per her report.   Iron deficiency anemia- followed by PCP and on  supplementation  IBS- corrected per her report     Plan:   1- For preventive management: A. Transition from Tegretol to Trileptal (oxcarbazepine) 150 mg twice a day x 2-3 days, then 300 mg twice a day. If you are tolerating then send me message, we might increase dose depending on how you are doing.              B. Start gabapentin 300 mg nightly  x 1 week, then increase to 600mg nightly     2- Start magnesium oxide or glycinate 400 mg daily     3- continue ENT evaluation     RTC in 6 weeks.       Debo Lazar MD   Board Certified Neurologist  [facility] Certified Headache Medicine     I spent  30  minutes on day of this encounter preparing, treating, and managing this patient with left facial pain, trigeminal neuralgia, migraine without aura, anxiety, depression, insomnia, ibs, iron deficiency anemia

## 2022-03-11 ENCOUNTER — PATIENT MESSAGE (OUTPATIENT)
Dept: NEUROLOGY | Facility: CLINIC | Age: 37
End: 2022-03-11
Payer: COMMERCIAL

## 2022-03-23 ENCOUNTER — PATIENT OUTREACH (OUTPATIENT)
Dept: ADMINISTRATIVE | Facility: HOSPITAL | Age: 37
End: 2022-03-23
Payer: COMMERCIAL

## 2022-03-23 ENCOUNTER — PATIENT MESSAGE (OUTPATIENT)
Dept: NEUROLOGY | Facility: CLINIC | Age: 37
End: 2022-03-23
Payer: COMMERCIAL

## 2022-03-23 NOTE — PROGRESS NOTES
Non-compliant report chart audits for CERVICAL CANCER SCREENING. Chart review completed for HM test overdue (mammograms, Colonoscopies, pap smears, DM labs, and/or EYE EXAMs)      Care Everywhere and media, updates requested and reviewed.      [facility] and [facility] reviewed.      Outreach to patient in reference to cervical cancer screening.    Outreach:  Cervical cancer screening    LMOR to return call to clinic

## 2022-04-04 ENCOUNTER — HOSPITAL ENCOUNTER (OUTPATIENT)
Dept: RADIOLOGY | Facility: HOSPITAL | Age: 37
Discharge: HOME | End: 2022-04-04
Attending: OTOLARYNGOLOGY
Payer: COMMERCIAL

## 2022-04-04 DIAGNOSIS — R51.9 FACIAL PAIN: ICD-10-CM

## 2022-04-04 DIAGNOSIS — G50.0 TRIGEMINAL NEURALGIA: ICD-10-CM

## 2022-04-04 DIAGNOSIS — J30.2 SEASONAL ALLERGIC RHINITIS, UNSPECIFIED TRIGGER: ICD-10-CM

## 2022-04-04 DIAGNOSIS — J34.1 MUCOCELE OF MAXILLARY SINUS: ICD-10-CM

## 2022-04-04 PROCEDURE — 70486 CT MAXILLOFACIAL W/O DYE: CPT | Mod: 26,,, | Performed by: RADIOLOGY

## 2022-04-04 PROCEDURE — 70486 CT MAXILLOFACIAL W/O DYE: CPT | Mod: TC,PO

## 2022-04-04 PROCEDURE — 70486 CT MEDTRONIC SINUSES WITHOUT: ICD-10-PCS | Mod: 26,,, | Performed by: RADIOLOGY

## 2022-05-13 ENCOUNTER — TELEPHONE (OUTPATIENT)
Dept: OTOLARYNGOLOGY | Facility: CLINIC | Age: 37
End: 2022-05-13
Payer: COMMERCIAL

## 2022-05-13 NOTE — TELEPHONE ENCOUNTER
----- Message from Sivakumar Esteban sent at 5/13/2022  2:39 PM CDT -----  Regarding: Pt Advice  Contact: YUNI RASCON [[id number]]  Name of Who is Calling: YUNI RASCON [[id number]]      What is the request in detail: Would like to speak with staff in regards to questions about last Ct scan and updated information. Please advise      Can the clinic reply by [facility]: yes      What Number to Call Back [facility] [phone]

## 2022-05-13 NOTE — TELEPHONE ENCOUNTER
The patient was concerned about the cyst in the nose  and she was concerned that as a child she used to put rocks in her nose.  She was not sure if it was a rock or truly a cyst in her nose.  I told her that I would pass it along to Dr. Rosen.

## 2022-05-31 ENCOUNTER — PATIENT MESSAGE (OUTPATIENT)
Dept: ADMINISTRATIVE | Facility: HOSPITAL | Age: 37
End: 2022-05-31
Payer: COMMERCIAL

## 2022-06-03 RX ORDER — OXCARBAZEPINE 150 MG/1
TABLET, FILM COATED ORAL
Qty: 360 TABLET | Refills: 1 | Status: SHIPPED | OUTPATIENT
Start: 2022-06-03

## 2022-06-29 ENCOUNTER — PATIENT MESSAGE (OUTPATIENT)
Dept: NEUROLOGY | Facility: CLINIC | Age: 37
End: 2022-06-29
Payer: COMMERCIAL

## 2025-04-17 ENCOUNTER — OFFICE VISIT (OUTPATIENT)
Dept: FAMILY MEDICINE | Facility: CLINIC | Age: 40
End: 2025-04-17
Payer: COMMERCIAL

## 2025-04-17 ENCOUNTER — LAB VISIT (OUTPATIENT)
Dept: LAB | Facility: HOSPITAL | Age: 40
End: 2025-04-17
Attending: INTERNAL MEDICINE
Payer: COMMERCIAL

## 2025-04-17 VITALS
OXYGEN SATURATION: 98 % | SYSTOLIC BLOOD PRESSURE: 106 MMHG | BODY MASS INDEX: 19.88 KG/M2 | HEIGHT: 63 IN | WEIGHT: 112.19 LBS | HEART RATE: 69 BPM | DIASTOLIC BLOOD PRESSURE: 78 MMHG

## 2025-04-17 DIAGNOSIS — R25.2 LEG CRAMPING: ICD-10-CM

## 2025-04-17 DIAGNOSIS — L98.9 SKIN LESIONS: ICD-10-CM

## 2025-04-17 DIAGNOSIS — R25.2 LEG CRAMPING: Primary | ICD-10-CM

## 2025-04-17 LAB
ANION GAP (OHS): 6 MMOL/L (ref 8–16)
BUN SERPL-MCNC: 8 MG/DL (ref 6–20)
CALCIUM SERPL-MCNC: 9.1 MG/DL (ref 8.7–10.5)
CHLORIDE SERPL-SCNC: 104 MMOL/L (ref 95–110)
CO2 SERPL-SCNC: 25 MMOL/L (ref 23–29)
CREAT SERPL-MCNC: 0.6 MG/DL (ref 0.5–1.4)
ERYTHROCYTE [DISTWIDTH] IN BLOOD BY AUTOMATED COUNT: 18.1 % (ref 11.5–14.5)
GFR SERPLBLD CREATININE-BSD FMLA CKD-EPI: >60 ML/MIN/1.73/M2
GLUCOSE SERPL-MCNC: 71 MG/DL (ref 70–110)
HCT VFR BLD AUTO: 32.7 % (ref 37–48.5)
HGB BLD-MCNC: 9.3 GM/DL (ref 12–16)
MAGNESIUM SERPL-MCNC: 1.9 MG/DL (ref 1.6–2.6)
MCH RBC QN AUTO: 20.2 PG (ref 27–31)
MCHC RBC AUTO-ENTMCNC: 28.4 G/DL (ref 32–36)
MCV RBC AUTO: 71 FL (ref 82–98)
PLATELET # BLD AUTO: 472 K/UL (ref 150–450)
PMV BLD AUTO: 11.9 FL (ref 9.2–12.9)
POTASSIUM SERPL-SCNC: 4 MMOL/L (ref 3.5–5.1)
RBC # BLD AUTO: 4.61 M/UL (ref 4–5.4)
SODIUM SERPL-SCNC: 135 MMOL/L (ref 136–145)
WBC # BLD AUTO: 2.84 K/UL (ref 3.9–12.7)

## 2025-04-17 PROCEDURE — 83735 ASSAY OF MAGNESIUM: CPT

## 2025-04-17 PROCEDURE — 36415 COLL VENOUS BLD VENIPUNCTURE: CPT | Mod: PN

## 2025-04-17 PROCEDURE — 82565 ASSAY OF CREATININE: CPT

## 2025-04-17 PROCEDURE — 99999 PR PBB SHADOW E&M-EST. PATIENT-LVL III: CPT | Mod: PBBFAC,,, | Performed by: INTERNAL MEDICINE

## 2025-04-17 PROCEDURE — 85027 COMPLETE CBC AUTOMATED: CPT

## 2025-04-17 RX ORDER — CLONAZEPAM 1 MG/1
1 TABLET ORAL NIGHTLY PRN
COMMUNITY
Start: 2025-03-11

## 2025-04-17 RX ORDER — ALPRAZOLAM 1 MG/1
1 TABLET ORAL DAILY PRN
COMMUNITY
Start: 2025-04-07

## 2025-04-17 NOTE — PROGRESS NOTES
Assessment and Plan:    1. Leg cramping (Primary)  Leg exam very normal.  Does not look at all suspicious for a DVT.  I suspect this is more of a hamstring pull which was discussed with patient.  She reports that she has been having some cramping in her leg as well, discussed obtaining electrolytes.  - Basic Metabolic Panel; Future  - Magnesium; Future    2. 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  However she reports that these all came on somewhat suddenly over the last two months.  Discussed that they do not have the typical appearance of petechiae but I would want to rule out thrombocytopenia.  She has a prior history of anemia and leukopenia but no prior history of thrombocytopenia.  - CBC Without Differential; Future      ______________________________________________________________________  Subjective:    Chief Complaint:  Pain in leg    HPI:  Kalia is a 40 y.o. year old female here to discuss pain in her leg    She is new to me she is a patient of Ismael Warner MD.    She reports that she relatively suddenly developed pain in her right leg on the medial side behind her knee about a week ago.  She denies any specific injury or change in activity.  She reports that she initially thought this was just a pulled muscle.  Yesterday she felt a bulge in this area and states that the skin was greenish in color which concerned her.  Today she can not feel the bulge.    She additionally reports that she wanted to discuss skin lesions on her abdomen.  Reports that since February of this year she has developed for tiny red spots that she states were previously not there.  She has never had anything like this before.  She has been worked up before by Hematology for low white blood cell count but has never had low platelets in the past.      Medications:  Medications Ordered Prior to Encounter[1]    Review of Systems:  Review of Systems  "  Respiratory:  Negative for shortness of breath.    Cardiovascular:  Negative for chest pain and leg swelling.   Musculoskeletal:  Negative for arthralgias and joint swelling.   Skin:  Negative for rash and wound.       Past Medical History:  Past Medical History:   Diagnosis Date    Anxiety     Depression     Headache     IBS (irritable bowel syndrome)        Objective:    Vitals:  Vitals:    04/17/25 1311   Height: 5' 3" (1.6 m)       Physical Exam  Vitals reviewed.   Constitutional:       General: She is not in acute distress.     Appearance: She is well-developed.   Eyes:      General:         Right eye: No discharge.         Left eye: No discharge.      Conjunctiva/sclera: Conjunctivae normal.   Cardiovascular:      Rate and Rhythm: Normal rate and regular rhythm.   Pulmonary:      Effort: Pulmonary effort is normal. No respiratory distress.   Musculoskeletal:      Comments: Right knee has no effusion, normal range of motion.  Patient reports the area of pain as over medial hamstring insertion, this is tender to palpation.  There is no varicose vein in this area.  There is no palpable abnormality in this area.  There is no swelling in the leg whatsoever, there is no erythema.   Skin:     General: Skin is warm and dry.   Neurological:      Mental Status: She is alert and oriented to person, place, and time.   Psychiatric:         Behavior: Behavior normal.         Thought Content: Thought content normal.         Judgment: Judgment normal.           Data:  Prior labs notable for anemia and leukopenia    Miladis Lucero MD  Internal Medicine         [1]   Current Outpatient Medications on File Prior to Visit   Medication Sig Dispense Refill    baclofen (LIORESAL) 10 MG tablet Take 0.5-1 tablets (5-10 mg total) by mouth 3 (three) times daily as needed (facial pain). (Patient not taking: Reported on 3/7/2022) 90 tablet 11    FLUoxetine 20 MG capsule Take 60 mg by mouth every morning.      gabapentin (NEURONTIN) 300 MG " capsule Take 1-2 capsules (300-600 mg total) by mouth 3 (three) times daily. 180 capsule 11    multivitamin capsule Take 1 capsule by mouth once daily.      naproxen (NAPROSYN) 500 MG tablet Take 1 tablet (500 mg total) by mouth 2 (two) times daily. 60 tablet 0    OXcarbazepine (TRILEPTAL) 150 MG Tab TAKE 1 TABLET BY MOUTH TWICE DAILY FOR 3 DAYS THEN 2 TABLEST BY MOUTH TWICE DAILY 360 tablet 1    traMADoL (ULTRAM) 50 mg tablet TAKE 1 TABLET BY MOUTH EVERY 6 HOURS AS NEEDED FOR PAIN 20 tablet 0     No current facility-administered medications on file prior to visit.      Daily

## 2025-05-14 ENCOUNTER — PATIENT MESSAGE (OUTPATIENT)
Dept: FAMILY MEDICINE | Facility: CLINIC | Age: 40
End: 2025-05-14
Payer: COMMERCIAL

## 2025-08-12 ENCOUNTER — TELEPHONE (OUTPATIENT)
Dept: PSYCHIATRY | Facility: CLINIC | Age: 40
End: 2025-08-12
Payer: COMMERCIAL

## 2025-08-14 ENCOUNTER — TELEPHONE (OUTPATIENT)
Dept: FAMILY MEDICINE | Facility: CLINIC | Age: 40
End: 2025-08-14
Payer: COMMERCIAL